# Patient Record
Sex: MALE | Race: WHITE | URBAN - METROPOLITAN AREA
[De-identification: names, ages, dates, MRNs, and addresses within clinical notes are randomized per-mention and may not be internally consistent; named-entity substitution may affect disease eponyms.]

---

## 2017-04-25 DIAGNOSIS — Z00.00 ROUTINE GENERAL MEDICAL EXAMINATION AT A HEALTH CARE FACILITY: Primary | ICD-10-CM

## 2017-04-25 DIAGNOSIS — E78.00 PURE HYPERCHOLESTEROLEMIA: ICD-10-CM

## 2017-04-25 RX ORDER — ATORVASTATIN CALCIUM 20 MG/1
20 TABLET, FILM COATED ORAL DAILY
Qty: 90 TABLET | Refills: 0 | Status: SHIPPED | OUTPATIENT
Start: 2017-04-25 | End: 2017-06-22 | Stop reason: SDUPTHER

## 2017-05-15 ENCOUNTER — NURSE ONLY (OUTPATIENT)
Dept: INTERNAL MEDICINE CLINIC | Age: 42
End: 2017-05-15

## 2017-05-15 DIAGNOSIS — Z00.00 ROUTINE GENERAL MEDICAL EXAMINATION AT A HEALTH CARE FACILITY: ICD-10-CM

## 2017-05-15 DIAGNOSIS — E78.00 PURE HYPERCHOLESTEROLEMIA: ICD-10-CM

## 2017-05-15 LAB
A/G RATIO: 1.5 (ref 1.1–2.2)
ALBUMIN SERPL-MCNC: 4.1 G/DL (ref 3.4–5)
ALP BLD-CCNC: 76 U/L (ref 40–129)
ALT SERPL-CCNC: 28 U/L (ref 10–40)
ANION GAP SERPL CALCULATED.3IONS-SCNC: 13 MMOL/L (ref 3–16)
AST SERPL-CCNC: 28 U/L (ref 15–37)
BILIRUB SERPL-MCNC: 0.6 MG/DL (ref 0–1)
BUN BLDV-MCNC: 18 MG/DL (ref 7–20)
CALCIUM SERPL-MCNC: 9.3 MG/DL (ref 8.3–10.6)
CHLORIDE BLD-SCNC: 105 MMOL/L (ref 99–110)
CHOLESTEROL, TOTAL: 187 MG/DL (ref 0–199)
CO2: 25 MMOL/L (ref 21–32)
CREAT SERPL-MCNC: 1.3 MG/DL (ref 0.9–1.3)
GFR AFRICAN AMERICAN: >60
GFR NON-AFRICAN AMERICAN: >60
GLOBULIN: 2.7 G/DL
GLUCOSE BLD-MCNC: 87 MG/DL (ref 70–99)
HCT VFR BLD CALC: 46.9 % (ref 40.5–52.5)
HDLC SERPL-MCNC: 42 MG/DL (ref 40–60)
HEMOGLOBIN: 15.3 G/DL (ref 13.5–17.5)
LDL CHOLESTEROL CALCULATED: 132 MG/DL
MCH RBC QN AUTO: 29.9 PG (ref 26–34)
MCHC RBC AUTO-ENTMCNC: 32.6 G/DL (ref 31–36)
MCV RBC AUTO: 91.7 FL (ref 80–100)
PDW BLD-RTO: 13.6 % (ref 12.4–15.4)
PLATELET # BLD: 158 K/UL (ref 135–450)
PMV BLD AUTO: 9.5 FL (ref 5–10.5)
POTASSIUM SERPL-SCNC: 4.6 MMOL/L (ref 3.5–5.1)
RBC # BLD: 5.11 M/UL (ref 4.2–5.9)
SODIUM BLD-SCNC: 143 MMOL/L (ref 136–145)
TOTAL PROTEIN: 6.8 G/DL (ref 6.4–8.2)
TRIGL SERPL-MCNC: 64 MG/DL (ref 0–150)
VLDLC SERPL CALC-MCNC: 13 MG/DL
WBC # BLD: 3.6 K/UL (ref 4–11)

## 2017-05-15 PROCEDURE — 36415 COLL VENOUS BLD VENIPUNCTURE: CPT | Performed by: INTERNAL MEDICINE

## 2017-06-22 ENCOUNTER — OFFICE VISIT (OUTPATIENT)
Dept: INTERNAL MEDICINE CLINIC | Age: 42
End: 2017-06-22

## 2017-06-22 VITALS
HEART RATE: 72 BPM | TEMPERATURE: 97.9 F | DIASTOLIC BLOOD PRESSURE: 80 MMHG | BODY MASS INDEX: 25.71 KG/M2 | SYSTOLIC BLOOD PRESSURE: 122 MMHG | WEIGHT: 179.19 LBS

## 2017-06-22 DIAGNOSIS — E78.00 PURE HYPERCHOLESTEROLEMIA: ICD-10-CM

## 2017-06-22 PROCEDURE — 99214 OFFICE O/P EST MOD 30 MIN: CPT | Performed by: INTERNAL MEDICINE

## 2017-06-22 RX ORDER — ATORVASTATIN CALCIUM 20 MG/1
20 TABLET, FILM COATED ORAL DAILY
Qty: 90 TABLET | Refills: 3 | Status: SHIPPED | OUTPATIENT
Start: 2017-06-22 | End: 2018-08-16 | Stop reason: SDUPTHER

## 2017-06-22 ASSESSMENT — ENCOUNTER SYMPTOMS
BACK PAIN: 0
APNEA: 0
GASTROINTESTINAL NEGATIVE: 1
RESPIRATORY NEGATIVE: 1
STRIDOR: 0
SINUS PRESSURE: 0
COLOR CHANGE: 0
CHOKING: 0
EYES NEGATIVE: 1

## 2018-08-16 DIAGNOSIS — E78.00 PURE HYPERCHOLESTEROLEMIA: ICD-10-CM

## 2018-08-16 RX ORDER — ATORVASTATIN CALCIUM 20 MG/1
TABLET, FILM COATED ORAL
Qty: 90 TABLET | Refills: 0 | Status: SHIPPED | OUTPATIENT
Start: 2018-08-16 | End: 2019-04-15 | Stop reason: SDUPTHER

## 2019-04-15 ENCOUNTER — OFFICE VISIT (OUTPATIENT)
Dept: INTERNAL MEDICINE CLINIC | Age: 44
End: 2019-04-15
Payer: COMMERCIAL

## 2019-04-15 VITALS
TEMPERATURE: 98.3 F | BODY MASS INDEX: 25.97 KG/M2 | OXYGEN SATURATION: 97 % | DIASTOLIC BLOOD PRESSURE: 80 MMHG | WEIGHT: 181 LBS | HEART RATE: 56 BPM | SYSTOLIC BLOOD PRESSURE: 124 MMHG

## 2019-04-15 DIAGNOSIS — C44.90 SKIN CANCER: ICD-10-CM

## 2019-04-15 DIAGNOSIS — E78.00 PURE HYPERCHOLESTEROLEMIA: Primary | ICD-10-CM

## 2019-04-15 DIAGNOSIS — Z00.00 ANNUAL PHYSICAL EXAM: ICD-10-CM

## 2019-04-15 LAB
BILIRUBIN, POC: NORMAL
BLOOD URINE, POC: NORMAL
CLARITY, POC: CLEAR
COLOR, POC: YELLOW
GLUCOSE URINE, POC: NORMAL
KETONES, POC: NORMAL
LEUKOCYTE EST, POC: NORMAL
NITRITE, POC: NORMAL
PH, POC: 6
PROTEIN, POC: NORMAL
SPECIFIC GRAVITY, POC: 1.02
UROBILINOGEN, POC: 0.2

## 2019-04-15 PROCEDURE — 81002 URINALYSIS NONAUTO W/O SCOPE: CPT | Performed by: INTERNAL MEDICINE

## 2019-04-15 PROCEDURE — 99214 OFFICE O/P EST MOD 30 MIN: CPT | Performed by: INTERNAL MEDICINE

## 2019-04-15 RX ORDER — ATORVASTATIN CALCIUM 20 MG/1
20 TABLET, FILM COATED ORAL DAILY
Qty: 90 TABLET | Refills: 3 | Status: SHIPPED | OUTPATIENT
Start: 2019-04-15 | End: 2020-05-18

## 2019-04-15 ASSESSMENT — PATIENT HEALTH QUESTIONNAIRE - PHQ9
2. FEELING DOWN, DEPRESSED OR HOPELESS: 0
1. LITTLE INTEREST OR PLEASURE IN DOING THINGS: 0
SUM OF ALL RESPONSES TO PHQ9 QUESTIONS 1 & 2: 0
SUM OF ALL RESPONSES TO PHQ QUESTIONS 1-9: 0
SUM OF ALL RESPONSES TO PHQ QUESTIONS 1-9: 0

## 2019-04-15 ASSESSMENT — ENCOUNTER SYMPTOMS
COLOR CHANGE: 0
SINUS PRESSURE: 0
BACK PAIN: 0
GASTROINTESTINAL NEGATIVE: 1
APNEA: 0
STRIDOR: 0
RESPIRATORY NEGATIVE: 1
CHOKING: 0
EYES NEGATIVE: 1

## 2019-04-15 NOTE — PROGRESS NOTES
Subjective:      Patient ID: Rosalia Lubin is a 39 y.o. male. Patient presents with:  Hyperlipidemia: CHOL MED REFILL, pt isn't fasting. Rosalia Lubin is a 39 y.o. male with the following history as recorded in [facility]: There are no active problems to display for this patient. Current Outpatient Medications:  atorvastatin (LIPITOR) 20 MG tablet, Take 1 tablet by mouth daily, Disp: 90 tablet, Rfl: 3    No current facility-administered medications for this visit. Allergies: Patient has no known allergies. Past Medical History:  No date: Hyperlipidemia  2013: Skin cancer      Comment:  right ear  No date: Snores  Past Surgical History:  2013: SKIN CANCER EXCISION  Review of patient's family history indicates:  Problem: High Cholesterol      Relation: Mother          Age of Onset: (Not Specified)    Social History    Tobacco Use      Smoking status: Never Smoker      Smokeless tobacco: Never Used    Alcohol use: Yes      Comment: ocassionally      Hyperlipidemia   This is a chronic problem. The current episode started more than 1 year ago. The problem is controlled. Recent lipid tests were reviewed and are normal. There are no known factors aggravating his hyperlipidemia. Pertinent negatives include no leg pain or myalgias. Current antihyperlipidemic treatment includes statins. The current treatment provides significant improvement of lipids. There are no compliance problems. Risk factors for coronary artery disease include dyslipidemia and male sex. Review of Systems   Constitutional: Negative. Negative for appetite change and fatigue. HENT: Negative. Negative for ear pain, hearing loss and sinus pressure. Eyes: Negative. Negative for visual disturbance. Respiratory: Negative. Negative for apnea, choking and stridor. Cardiovascular: Negative. Gastrointestinal: Negative. Genitourinary: Negative. Negative for discharge, hematuria, penile pain and penile swelling. Musculoskeletal: Negative. Negative for back pain and myalgias. Skin: Negative. Negative for color change and pallor. Neurological: Negative. Negative for dizziness, tremors, seizures and syncope. Hematological: Negative. Does not bruise/bleed easily. Psychiatric/Behavioral: Negative. Negative for confusion, decreased concentration and dysphoric mood. Objective:   Physical Exam   Constitutional: He appears well-developed and well-nourished. HENT:   Head: Normocephalic and atraumatic. Right Ear: External ear normal.   Left Ear: External ear normal.   Nose: Nose normal.   Mouth/Throat: Oropharynx is clear and moist.   Eyes: Conjunctivae are normal. Right eye exhibits no discharge. Left eye exhibits no discharge. No scleral icterus. Neck: Normal range of motion. Neck supple. No JVD present. No tracheal deviation present. No thyromegaly present. Cardiovascular: Normal rate, regular rhythm, normal heart sounds and intact distal pulses. Exam reveals no gallop and no friction rub. No murmur heard. Pulmonary/Chest: Effort normal and breath sounds normal. No stridor. No respiratory distress. He has no wheezes. He has no rales. He exhibits no tenderness. Abdominal: Soft. Bowel sounds are normal. He exhibits no distension and no mass. There is no tenderness. There is no rebound and no guarding. Genitourinary: Rectum normal, prostate normal and penis normal. Rectal exam shows guaiac negative stool. No penile tenderness. Musculoskeletal: He exhibits no edema or tenderness. Lymphadenopathy:     He has no cervical adenopathy. Neurological: He has normal reflexes. He displays normal reflexes. No cranial nerve deficit. He exhibits normal muscle tone. Coordination normal.   Skin: Skin is warm and dry. No rash noted. No erythema. No pallor. Psychiatric: He has a normal mood and affect.  His behavior is normal. Judgment and thought content normal.       Assessment:      Encounter Diagnoses Name Primary?  Pure hypercholesterolemia Yes    Annual physical exam     Skin cancer            Plan:      Galilea Mercado was seen today for hyperlipidemia. Diagnoses and all orders for this visit:    Pure hypercholesterolemia  -     atorvastatin (LIPITOR) 20 MG tablet; Take 1 tablet by mouth daily  -     Comprehensive Metabolic Panel  -     CBC  -     Lipid Panel  -     TSH without Reflex  -     POCT Urinalysis no Micro    Annual physical exam    Skin cancer      Stable skin cancer on his ear lobe.         Nicolas Pennington MD

## 2019-04-15 NOTE — PATIENT INSTRUCTIONS
Please call your pharmacy if you need any refills of your medication(s). Please call our office at [phone] if you don't hear from us about your test results. Bring an accurate list of your medications with you at every appointment to ensure that we have the correct information.     Our office hours are: Monday - Friday 8:30 am- 5 pm    Phone lines turn on at 8:30 am

## 2019-07-09 ENCOUNTER — TELEPHONE (OUTPATIENT)
Dept: INTERNAL MEDICINE CLINIC | Age: 44
End: 2019-07-09

## 2019-07-09 DIAGNOSIS — E78.00 PURE HYPERCHOLESTEROLEMIA: ICD-10-CM

## 2019-07-09 DIAGNOSIS — Z00.00 ANNUAL PHYSICAL EXAM: Primary | ICD-10-CM

## 2019-07-09 NOTE — TELEPHONE ENCOUNTER
Pt was seen in April 2019 and was given bw orders. Pt has lost the orders and needs them to be put in epic again.  Pt will get the bw done at the [facility] tomorrow AM.

## 2019-07-10 DIAGNOSIS — Z00.00 ANNUAL PHYSICAL EXAM: ICD-10-CM

## 2019-07-10 DIAGNOSIS — E78.00 PURE HYPERCHOLESTEROLEMIA: ICD-10-CM

## 2019-07-10 LAB
A/G RATIO: 1.7 (ref 1.1–2.2)
ALBUMIN SERPL-MCNC: 4.2 G/DL (ref 3.4–5)
ALP BLD-CCNC: 71 U/L (ref 40–129)
ALT SERPL-CCNC: 13 U/L (ref 10–40)
ANION GAP SERPL CALCULATED.3IONS-SCNC: 10 MMOL/L (ref 3–16)
AST SERPL-CCNC: 19 U/L (ref 15–37)
BILIRUB SERPL-MCNC: 0.4 MG/DL (ref 0–1)
BUN BLDV-MCNC: 21 MG/DL (ref 7–20)
CALCIUM SERPL-MCNC: 9.4 MG/DL (ref 8.3–10.6)
CHLORIDE BLD-SCNC: 108 MMOL/L (ref 99–110)
CHOLESTEROL, TOTAL: 167 MG/DL (ref 0–199)
CO2: 25 MMOL/L (ref 21–32)
CREAT SERPL-MCNC: 1 MG/DL (ref 0.9–1.3)
GFR AFRICAN AMERICAN: >60
GFR NON-AFRICAN AMERICAN: >60
GLOBULIN: 2.5 G/DL
GLUCOSE BLD-MCNC: 98 MG/DL (ref 70–99)
HCT VFR BLD CALC: 44.7 % (ref 40.5–52.5)
HDLC SERPL-MCNC: 36 MG/DL (ref 40–60)
HEMOGLOBIN: 14.8 G/DL (ref 13.5–17.5)
LDL CHOLESTEROL CALCULATED: 115 MG/DL
MCH RBC QN AUTO: 29.8 PG (ref 26–34)
MCHC RBC AUTO-ENTMCNC: 33 G/DL (ref 31–36)
MCV RBC AUTO: 90.3 FL (ref 80–100)
PDW BLD-RTO: 13.3 % (ref 12.4–15.4)
PLATELET # BLD: 141 K/UL (ref 135–450)
PMV BLD AUTO: 9.5 FL (ref 5–10.5)
POTASSIUM SERPL-SCNC: 4.5 MMOL/L (ref 3.5–5.1)
RBC # BLD: 4.95 M/UL (ref 4.2–5.9)
SODIUM BLD-SCNC: 143 MMOL/L (ref 136–145)
TOTAL PROTEIN: 6.7 G/DL (ref 6.4–8.2)
TRIGL SERPL-MCNC: 82 MG/DL (ref 0–150)
VLDLC SERPL CALC-MCNC: 16 MG/DL
WBC # BLD: 3.6 K/UL (ref 4–11)

## 2020-05-18 RX ORDER — ATORVASTATIN CALCIUM 20 MG/1
20 TABLET, FILM COATED ORAL DAILY
Qty: 90 TABLET | Refills: 3 | Status: SHIPPED | OUTPATIENT
Start: 2020-05-18 | End: 2021-07-09 | Stop reason: SDUPTHER

## 2020-10-23 ENCOUNTER — OFFICE VISIT (OUTPATIENT)
Dept: PRIMARY CARE CLINIC | Age: 45
End: 2020-10-23
Payer: COMMERCIAL

## 2020-10-23 PROCEDURE — 99211 OFF/OP EST MAY X REQ PHY/QHP: CPT | Performed by: NURSE PRACTITIONER

## 2020-10-23 NOTE — PROGRESS NOTES
Smitha Lopez received a viral test for COVID-19. They were educated on isolation and quarantine as appropriate. For any symptoms, they were directed to seek care from their PCP, given contact information to establish with a doctor, directed to an urgent care or the emergency room.

## 2020-10-24 LAB — SARS-COV-2, NAA: NOT DETECTED

## 2020-10-26 NOTE — RESULT ENCOUNTER NOTE

## 2020-12-18 ENCOUNTER — OFFICE VISIT (OUTPATIENT)
Dept: PRIMARY CARE CLINIC | Age: 45
End: 2020-12-18
Payer: COMMERCIAL

## 2020-12-18 LAB — SARS-COV-2, NAA: DETECTED

## 2020-12-18 PROCEDURE — 99211 OFF/OP EST MAY X REQ PHY/QHP: CPT | Performed by: NURSE PRACTITIONER

## 2020-12-18 NOTE — PROGRESS NOTES
Tamar Linder received a viral test for COVID-19. They were educated on isolation and quarantine as appropriate. For any symptoms, they were directed to seek care from their PCP, given contact information to establish with a doctor, directed to an urgent care or the emergency room.

## 2021-02-22 ENCOUNTER — TELEPHONE (OUTPATIENT)
Dept: FAMILY MEDICINE CLINIC | Age: 46
End: 2021-02-22

## 2021-02-22 NOTE — TELEPHONE ENCOUNTER
Pt tested positive on 12-18-20 and is recovered. Can you write a letter stating he has recovered from Covid and is able to travel?

## 2021-02-22 NOTE — TELEPHONE ENCOUNTER
Pt had covid 2 months ago, he is going to be traveling out of the country - leaving this Friday. [employer] ([employer] is needing a letter stating that pt is good and over covid - a document of recovery. Pl advise.    [phone]

## 2021-07-09 ENCOUNTER — OFFICE VISIT (OUTPATIENT)
Dept: FAMILY MEDICINE CLINIC | Age: 46
End: 2021-07-09
Payer: COMMERCIAL

## 2021-07-09 VITALS
BODY MASS INDEX: 26.34 KG/M2 | WEIGHT: 184 LBS | SYSTOLIC BLOOD PRESSURE: 124 MMHG | DIASTOLIC BLOOD PRESSURE: 84 MMHG | TEMPERATURE: 97.9 F | HEIGHT: 70 IN

## 2021-07-09 DIAGNOSIS — Z00.00 ANNUAL PHYSICAL EXAM: ICD-10-CM

## 2021-07-09 DIAGNOSIS — E78.00 PURE HYPERCHOLESTEROLEMIA: ICD-10-CM

## 2021-07-09 DIAGNOSIS — B35.1 NAIL FUNGUS: Primary | ICD-10-CM

## 2021-07-09 PROCEDURE — 99203 OFFICE O/P NEW LOW 30 MIN: CPT | Performed by: NURSE PRACTITIONER

## 2021-07-09 RX ORDER — ATORVASTATIN CALCIUM 20 MG/1
20 TABLET, FILM COATED ORAL DAILY
Qty: 90 TABLET | Refills: 3 | Status: SHIPPED | OUTPATIENT
Start: 2021-07-09 | End: 2022-07-18

## 2021-07-09 RX ORDER — TERBINAFINE HYDROCHLORIDE 250 MG/1
250 TABLET ORAL DAILY
Qty: 84 TABLET | Refills: 0 | Status: SHIPPED | OUTPATIENT
Start: 2021-07-09 | End: 2021-10-01

## 2021-07-09 SDOH — ECONOMIC STABILITY: FOOD INSECURITY: WITHIN THE PAST 12 MONTHS, YOU WORRIED THAT YOUR FOOD WOULD RUN OUT BEFORE YOU GOT MONEY TO BUY MORE.: NEVER TRUE

## 2021-07-09 SDOH — ECONOMIC STABILITY: FOOD INSECURITY: WITHIN THE PAST 12 MONTHS, THE FOOD YOU BOUGHT JUST DIDN'T LAST AND YOU DIDN'T HAVE MONEY TO GET MORE.: NEVER TRUE

## 2021-07-09 ASSESSMENT — PATIENT HEALTH QUESTIONNAIRE - PHQ9
SUM OF ALL RESPONSES TO PHQ QUESTIONS 1-9: 0
SUM OF ALL RESPONSES TO PHQ9 QUESTIONS 1 & 2: 0
SUM OF ALL RESPONSES TO PHQ QUESTIONS 1-9: 0
2. FEELING DOWN, DEPRESSED OR HOPELESS: 0
SUM OF ALL RESPONSES TO PHQ QUESTIONS 1-9: 0
1. LITTLE INTEREST OR PLEASURE IN DOING THINGS: 0

## 2021-07-09 ASSESSMENT — ENCOUNTER SYMPTOMS
ABDOMINAL PAIN: 0
BACK PAIN: 0
CONSTIPATION: 0
WHEEZING: 0
TROUBLE SWALLOWING: 0
BLOOD IN STOOL: 0
SHORTNESS OF BREATH: 0
DIARRHEA: 0
APNEA: 0
COLOR CHANGE: 0
COUGH: 0
NAUSEA: 0
VOMITING: 0
SINUS PAIN: 0
SORE THROAT: 0

## 2021-07-09 ASSESSMENT — SOCIAL DETERMINANTS OF HEALTH (SDOH): HOW HARD IS IT FOR YOU TO PAY FOR THE VERY BASICS LIKE FOOD, HOUSING, MEDICAL CARE, AND HEATING?: NOT HARD AT ALL

## 2021-07-09 NOTE — PROGRESS NOTES
Date: 7/9/2021        Patient Name: Smitha Lopez     YOB: 1975      Age:  54 y.o. Chief Complaint     Chief Complaint   Patient presents with    Check-Up     hyperlipidemia- patient denies any complaints at this time and request Rx refill: Lipitor          History Obtained From   patient    History of Present Illness   Presents today to establish care. Complains of toe nail fungus for the past few weeks, no relief with OTC antifungals. Reports he feels like his feet are cold and he has some cold intolerance. Denies changes in hair, nails or skin. Overall feels well. Exercises and eats healthy. Sleeps well. Past Medical History     Past Medical History:   Diagnosis Date    Hyperlipidemia     Skin cancer 2013    right ear    Snores         Past Surgical History     Past Surgical History:   Procedure Laterality Date    SKIN CANCER EXCISION  2013        Medications Prior to Admission     Prior to Admission medications    Medication Sig Start Date End Date Taking? Authorizing Provider   terbinafine (LAMISIL) 250 MG tablet Take 1 tablet by mouth daily 7/9/21 10/1/21 Yes EDU Gonzalez NP   atorvastatin (LIPITOR) 20 MG tablet Take 1 tablet by mouth daily 7/9/21  Yes EDU Gonzalez NP        Allergies   Patient has no known allergies. Social History     Social History     Tobacco History     Smoking Status  Never Smoker    Smokeless Tobacco Use  Never Used          Alcohol History     Alcohol Use Status  Yes Comment  ocassionally          Drug Use     Drug Use Status  No          Sexual Activity     Sexually Active  Not Asked                Family History     Family History   Problem Relation Age of Onset    High Cholesterol Mother        Review of Systems   Review of Systems   Constitutional: Negative for activity change, fatigue, fever and unexpected weight change. HENT: Negative for congestion, nosebleeds, sinus pain, sore throat and trouble swallowing. Respiratory: Negative for apnea, cough, shortness of breath and wheezing. Cardiovascular: Negative for chest pain, palpitations and leg swelling. Gastrointestinal: Negative for abdominal pain, blood in stool, constipation, diarrhea, nausea and vomiting. Endocrine: Positive for cold intolerance. Negative for heat intolerance. Genitourinary: Negative for difficulty urinating, dysuria, flank pain, frequency and urgency. Musculoskeletal: Negative for arthralgias, back pain, gait problem, joint swelling and myalgias. Skin: Negative for color change, rash and wound. Neurological: Negative for dizziness, weakness, light-headedness and headaches. Hematological: Does not bruise/bleed easily. Psychiatric/Behavioral: Negative for dysphoric mood and suicidal ideas. The patient is not nervous/anxious. Physical Exam   /84 (Site: Right Upper Arm, Position: Sitting, Cuff Size: Medium Adult)   Temp 97.9 °F (36.6 °C) (Temporal)   Ht 5' 10\" (1.778 m)   Wt 184 lb (83.5 kg)   BMI 26.40 kg/m²     Physical Exam  Constitutional:       General: He is not in acute distress. Appearance: Normal appearance. He is normal weight. He is not ill-appearing, toxic-appearing or diaphoretic. HENT:      Head: Normocephalic and atraumatic. Right Ear: Tympanic membrane, ear canal and external ear normal. There is no impacted cerumen. Left Ear: Tympanic membrane, ear canal and external ear normal. There is no impacted cerumen. Nose: Nose normal. No congestion or rhinorrhea. Mouth/Throat:      Mouth: Mucous membranes are moist.      Pharynx: Oropharynx is clear. No oropharyngeal exudate or posterior oropharyngeal erythema. Eyes:      General: No scleral icterus. Right eye: No discharge. Left eye: No discharge. Extraocular Movements: Extraocular movements intact. Conjunctiva/sclera: Conjunctivae normal.      Pupils: Pupils are equal, round, and reactive to light. Neck:      Vascular: No carotid bruit. Cardiovascular:      Rate and Rhythm: Normal rate and regular rhythm. Pulses: Normal pulses. Heart sounds: Normal heart sounds. No murmur heard. No friction rub. No gallop. Pulmonary:      Effort: Pulmonary effort is normal. No respiratory distress. Breath sounds: Normal breath sounds. No stridor. No wheezing, rhonchi or rales. Chest:      Chest wall: No tenderness. Abdominal:      General: Bowel sounds are normal. There is no distension. Palpations: Abdomen is soft. There is no hepatomegaly, splenomegaly or mass. Tenderness: There is no abdominal tenderness. There is no right CVA tenderness or left CVA tenderness. Hernia: No hernia is present. Musculoskeletal:         General: No swelling, tenderness, deformity or signs of injury. Cervical back: Normal range of motion and neck supple. No rigidity or tenderness. Right lower leg: No edema. Left lower leg: No edema. Feet:      Left foot:      Toenail Condition: Fungal disease present. Comments: Right fifth toe nail  Lymphadenopathy:      Cervical: No cervical adenopathy. Skin:     General: Skin is warm and dry. Capillary Refill: Capillary refill takes less than 2 seconds. Coloration: Skin is not jaundiced or pale. Findings: No bruising, erythema, lesion or rash. Neurological:      General: No focal deficit present. Mental Status: He is alert and oriented to person, place, and time. Sensory: No sensory deficit. Motor: No weakness.       Coordination: Coordination normal.   Psychiatric:         Mood and Affect: Mood normal.         Labs      Office Visit on 12/18/2020   Component Date Value Ref Range Status    SARS-CoV-2, GRACIELA 12/18/2020 DETECTED* NOT DETECTED Final    Comment: The SARS-CoV-2 assay is a real-time RT-PCR test intended for the  qualitative detection of nucleic acid from the SARS-CoV-2 in  respiratory specimens from individuals. Testing is limited to the  guidelines of FDA Emergency Use Authorization FDA for performing  SARS-CoV-2 testing. Detected results are indicative of the presence of SARS-CoV-2 RNA but  do not rule out bacterial infection or co-infection with other viruses. Test results should be used with caution and in conjunction with other  clinical and laboratory data in making a diagnosis. Performed at: 1 [facility] [address]  : Sirena Ivy MD            [address] [place] [address]   1. Nail fungus  Start Lamisil today, avoid alcohol while taking. 2. Pure hypercholesterolemia  Repeat labs and refilled lipitor  - CBC; Future  - Comprehensive Metabolic Panel; Future  - Lipid Panel; Future  - atorvastatin (LIPITOR) 20 MG tablet; Take 1 tablet by mouth daily  Dispense: 90 tablet; Refill: 3    3. Annual physical exam    - TSH without Reflex;  Future         Electronically signed by EDU Blank NP on 7/9/21 at 4:18 PM EDT

## 2021-07-09 NOTE — PATIENT INSTRUCTIONS
Thank you for choosing [facility]. Please bring a current list of medications to every appointment. Please contact your pharmacy for any prescription refill(s) that you are requesting. Patient Education        Toenail Fungus: Care Instructions  Overview  A nail that is infected by a fungus usually turns white or yellow. As the fungus spreads, the nail turns a darker color and gets thicker. And the nail edges start to turn ragged and crumble. A bad infection can cause pain, and the nail may pull away from the toe or finger. Nails that are exposed to moisture and warmth a lot are more likely to get infected by a fungus. This can happen from wearing sweaty shoes often and from walking barefoot on shower floors. Or it can happen if you share personal things, such as towels and nail clippers. It's hard to treat nail fungus. And the infection can return after it has cleared up. But medicines can sometimes get rid of nail fungus for good. If the infection is very bad, or if it causes a lot of pain, you may need to have the nail removed. Follow-up care is a key part of your treatment and safety. Be sure to make and go to all appointments, and call your doctor if you are having problems. It's also a good idea to know your test results and keep a list of the medicines you take. How can you care for yourself at home? · Take your medicines exactly as prescribed. Call your doctor if you have any problems with your medicine. · If your doctor gave you a cream or liquid to put on your nail, use it exactly as directed. · Wash your hands and feet often, and wash your hands after touching your feet. · Keep your nails clean and dry. Dry your feet completely after you bathe and before you put on shoes and socks. · Keep your nails trimmed. · Change socks often. Wear dry socks that absorb moisture. · Don't go barefoot in public places.   · Use a spray or powder that fights fungus on your feet and in your

## 2021-08-09 DIAGNOSIS — D72.819 LEUKOPENIA, UNSPECIFIED TYPE: Primary | ICD-10-CM

## 2022-07-18 DIAGNOSIS — E78.00 PURE HYPERCHOLESTEROLEMIA: ICD-10-CM

## 2022-07-18 RX ORDER — ATORVASTATIN CALCIUM 20 MG/1
TABLET, FILM COATED ORAL
Qty: 90 TABLET | Refills: 2 | Status: SHIPPED | OUTPATIENT
Start: 2022-07-18 | End: 2022-09-22 | Stop reason: SDUPTHER

## 2022-09-22 ENCOUNTER — TELEPHONE (OUTPATIENT)
Dept: FAMILY MEDICINE CLINIC | Age: 47
End: 2022-09-22

## 2022-09-22 DIAGNOSIS — E78.00 PURE HYPERCHOLESTEROLEMIA: ICD-10-CM

## 2022-09-22 RX ORDER — ATORVASTATIN CALCIUM 20 MG/1
TABLET, FILM COATED ORAL
Qty: 90 TABLET | Refills: 2 | Status: SHIPPED | OUTPATIENT
Start: 2022-09-22

## 2022-09-22 NOTE — TELEPHONE ENCOUNTER
Patient has been out of lipitor for over 2 weeks, he is asking if you can send a script to [facility]. [facility].5. He will take for a month and then come in for physical and labs.       Please call, [phone]

## 2022-09-26 DIAGNOSIS — E78.00 PURE HYPERCHOLESTEROLEMIA: ICD-10-CM

## 2022-09-26 RX ORDER — ATORVASTATIN CALCIUM 20 MG/1
TABLET, FILM COATED ORAL
Qty: 90 TABLET | Refills: 2 | OUTPATIENT
Start: 2022-09-26

## 2023-09-24 DIAGNOSIS — E78.00 PURE HYPERCHOLESTEROLEMIA: ICD-10-CM

## 2023-09-25 RX ORDER — ATORVASTATIN CALCIUM 20 MG/1
TABLET, FILM COATED ORAL
Qty: 90 TABLET | Refills: 2 | OUTPATIENT
Start: 2023-09-25

## 2023-10-27 DIAGNOSIS — E78.00 PURE HYPERCHOLESTEROLEMIA: ICD-10-CM

## 2023-10-27 RX ORDER — ATORVASTATIN CALCIUM 20 MG/1
TABLET, FILM COATED ORAL
Qty: 90 TABLET | Refills: 2 | OUTPATIENT
Start: 2023-10-27

## 2023-10-28 DIAGNOSIS — E78.00 PURE HYPERCHOLESTEROLEMIA: ICD-10-CM

## 2023-10-30 ENCOUNTER — PATIENT MESSAGE (OUTPATIENT)
Dept: FAMILY MEDICINE CLINIC | Age: 48
End: 2023-10-30

## 2023-10-30 DIAGNOSIS — E78.00 PURE HYPERCHOLESTEROLEMIA: ICD-10-CM

## 2023-10-30 RX ORDER — ATORVASTATIN CALCIUM 20 MG/1
TABLET, FILM COATED ORAL
Qty: 90 TABLET | Refills: 2 | OUTPATIENT
Start: 2023-10-30

## 2023-10-31 ENCOUNTER — OFFICE VISIT (OUTPATIENT)
Dept: FAMILY MEDICINE CLINIC | Age: 48
End: 2023-10-31
Payer: COMMERCIAL

## 2023-10-31 VITALS
BODY MASS INDEX: 26.8 KG/M2 | HEART RATE: 58 BPM | TEMPERATURE: 97.7 F | DIASTOLIC BLOOD PRESSURE: 70 MMHG | WEIGHT: 187.2 LBS | OXYGEN SATURATION: 96 % | SYSTOLIC BLOOD PRESSURE: 108 MMHG | HEIGHT: 70 IN

## 2023-10-31 DIAGNOSIS — R53.83 OTHER FATIGUE: ICD-10-CM

## 2023-10-31 DIAGNOSIS — E78.2 MIXED HYPERLIPIDEMIA: ICD-10-CM

## 2023-10-31 DIAGNOSIS — Z23 NEED FOR TDAP VACCINATION: ICD-10-CM

## 2023-10-31 DIAGNOSIS — Z00.00 ANNUAL PHYSICAL EXAM: Primary | ICD-10-CM

## 2023-10-31 DIAGNOSIS — Z12.11 COLON CANCER SCREENING: ICD-10-CM

## 2023-10-31 LAB
ALBUMIN SERPL-MCNC: 4.3 G/DL (ref 3.4–5)
ALBUMIN/GLOB SERPL: 1.5 {RATIO} (ref 1.1–2.2)
ALP SERPL-CCNC: 78 U/L (ref 40–129)
ALT SERPL-CCNC: 22 U/L (ref 10–40)
ANION GAP SERPL CALCULATED.3IONS-SCNC: 9 MMOL/L (ref 3–16)
AST SERPL-CCNC: 23 U/L (ref 15–37)
BILIRUB SERPL-MCNC: 0.4 MG/DL (ref 0–1)
BUN SERPL-MCNC: 15 MG/DL (ref 7–20)
CALCIUM SERPL-MCNC: 9.5 MG/DL (ref 8.3–10.6)
CHLORIDE SERPL-SCNC: 106 MMOL/L (ref 99–110)
CHOLEST SERPL-MCNC: 209 MG/DL (ref 0–199)
CO2 SERPL-SCNC: 26 MMOL/L (ref 21–32)
CREAT SERPL-MCNC: 1.2 MG/DL (ref 0.9–1.3)
GFR SERPLBLD CREATININE-BSD FMLA CKD-EPI: >60 ML/MIN/{1.73_M2}
GLUCOSE SERPL-MCNC: 91 MG/DL (ref 70–99)
HDLC SERPL-MCNC: 41 MG/DL (ref 40–60)
LDL CHOLESTEROL CALCULATED: 155 MG/DL
POTASSIUM SERPL-SCNC: 5 MMOL/L (ref 3.5–5.1)
PROT SERPL-MCNC: 7.1 G/DL (ref 6.4–8.2)
SODIUM SERPL-SCNC: 141 MMOL/L (ref 136–145)
TRIGL SERPL-MCNC: 63 MG/DL (ref 0–150)
VLDLC SERPL CALC-MCNC: 13 MG/DL

## 2023-10-31 PROCEDURE — 90715 TDAP VACCINE 7 YRS/> IM: CPT | Performed by: NURSE PRACTITIONER

## 2023-10-31 PROCEDURE — 99396 PREV VISIT EST AGE 40-64: CPT | Performed by: NURSE PRACTITIONER

## 2023-10-31 PROCEDURE — 90471 IMMUNIZATION ADMIN: CPT | Performed by: NURSE PRACTITIONER

## 2023-10-31 PROCEDURE — G8484 FLU IMMUNIZE NO ADMIN: HCPCS | Performed by: NURSE PRACTITIONER

## 2023-10-31 RX ORDER — ATORVASTATIN CALCIUM 20 MG/1
TABLET, FILM COATED ORAL
Qty: 90 TABLET | Refills: 2 | Status: SHIPPED | OUTPATIENT
Start: 2023-10-31

## 2023-10-31 SDOH — ECONOMIC STABILITY: FOOD INSECURITY: WITHIN THE PAST 12 MONTHS, YOU WORRIED THAT YOUR FOOD WOULD RUN OUT BEFORE YOU GOT MONEY TO BUY MORE.: NEVER TRUE

## 2023-10-31 SDOH — ECONOMIC STABILITY: INCOME INSECURITY: HOW HARD IS IT FOR YOU TO PAY FOR THE VERY BASICS LIKE FOOD, HOUSING, MEDICAL CARE, AND HEATING?: NOT VERY HARD

## 2023-10-31 SDOH — ECONOMIC STABILITY: FOOD INSECURITY: WITHIN THE PAST 12 MONTHS, THE FOOD YOU BOUGHT JUST DIDN'T LAST AND YOU DIDN'T HAVE MONEY TO GET MORE.: NEVER TRUE

## 2023-10-31 SDOH — ECONOMIC STABILITY: HOUSING INSECURITY
IN THE LAST 12 MONTHS, WAS THERE A TIME WHEN YOU DID NOT HAVE A STEADY PLACE TO SLEEP OR SLEPT IN A SHELTER (INCLUDING NOW)?: NO

## 2023-10-31 ASSESSMENT — PATIENT HEALTH QUESTIONNAIRE - PHQ9
1. LITTLE INTEREST OR PLEASURE IN DOING THINGS: 0
SUM OF ALL RESPONSES TO PHQ QUESTIONS 1-9: 0
SUM OF ALL RESPONSES TO PHQ9 QUESTIONS 1 & 2: 0
2. FEELING DOWN, DEPRESSED OR HOPELESS: 0
SUM OF ALL RESPONSES TO PHQ QUESTIONS 1-9: 0

## 2023-10-31 ASSESSMENT — ENCOUNTER SYMPTOMS
ABDOMINAL PAIN: 0
COUGH: 0
CONSTIPATION: 0
SHORTNESS OF BREATH: 0

## 2023-10-31 NOTE — PROGRESS NOTES
Jacque Barber (:  1975) is a 51 y.o. male,Established patient, here for evaluation of the following chief complaint(s): Annual Exam (Annual exam and is fasting for labs. Due for a colonoscopy ) and Medication Refill (Lipitor )         ASSESSMENT/PLAN:  1. Annual physical exam  Up to date on vision/dental. Declined flu, agreeable to tdap and colon ca screen. 2. Other fatigue  New. Discussed staying well hydrated and aviding excessive caffeine use. Hx of snoring discussed sleep study  - Charlie Patterson MD, Sleep Medicine, [facility] [place]    3. Mixed hyperlipidemia  Stable. Refilled medication today, due for repeat labs. - Lipid, Fasting; Future  - Comprehensive Metabolic Panel; Future    4. Need for Tdap vaccination  Updated in office today. 5. Colon cancer screening  Agreeable to cologuard. Discussed sending the kit back within 1 wk of receiving.   - Fecal DNA Colorectal cancer screening (Cologuard)       Return for yearly physical.         Subjective   SUBJECTIVE/OBJECTIVE:  HPI  Presents today for annual physical. Overall feeling well, denies concerns or complaints. 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 Lives at home, with girlfriend 15 yr old son. 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 Eats out a few times a week. Up to date on vision exam.  Up to date on dental exam.  Intermittent fatigue, sleeps ok, at least 8 hrs, reports he snores. Current Outpatient Medications   Medication Sig Dispense Refill    atorvastatin (LIPITOR) 20 MG tablet TAKE 1 TABLET BY MOUTH EVERY DAY 90 tablet 2     No current facility-administered medications for this visit. Past Medical History:   Diagnosis Date    Hyperlipidemia     Skin cancer     right ear    Snores         Review of Systems   Constitutional:  Positive for fatigue. Negative for fever and unexpected weight change.    Eyes:  Negative for Cheiloplasty (Less Than 50%) Text: A decision was made to reconstruct the defect with a  cheiloplasty.  The defect was undermined extensively.  Additional obicularis oris muscle was excised with a 15 blade scalpel.  The defect was converted into a full thickness wedge, of less than 50% of the vertical height of the lip, to facilite a better cosmetic result.  Small vessels were then tied off with 5-0 monocyrl. The obicularis oris, superficial fascia, adipose and dermis were then reapproximated.  After the deeper layers were approximated the epidermis was reapproximated with particular care given to realign the vermilion border.

## 2023-12-01 ENCOUNTER — PATIENT MESSAGE (OUTPATIENT)
Dept: FAMILY MEDICINE CLINIC | Age: 48
End: 2023-12-01

## 2023-12-01 LAB — NONINV COLON CA DNA+OCC BLD SCRN STL QL: NEGATIVE

## 2024-08-04 DIAGNOSIS — E78.00 PURE HYPERCHOLESTEROLEMIA: ICD-10-CM

## 2024-08-05 RX ORDER — ATORVASTATIN CALCIUM 20 MG/1
TABLET, FILM COATED ORAL
Qty: 90 TABLET | Refills: 2 | Status: SHIPPED | OUTPATIENT
Start: 2024-08-05

## 2025-05-16 DIAGNOSIS — E78.00 PURE HYPERCHOLESTEROLEMIA: ICD-10-CM

## 2025-05-16 RX ORDER — ATORVASTATIN CALCIUM 20 MG/1
20 TABLET, FILM COATED ORAL DAILY
Qty: 90 TABLET | Refills: 2 | Status: SHIPPED | OUTPATIENT
Start: 2025-05-16

## 2025-05-16 NOTE — TELEPHONE ENCOUNTER
Last OV: 10-  Next OV: None at this time     Scheduling ticket and letter mailed to the patient as of today to schedule an appointment.